# Patient Record
Sex: FEMALE | URBAN - METROPOLITAN AREA
[De-identification: names, ages, dates, MRNs, and addresses within clinical notes are randomized per-mention and may not be internally consistent; named-entity substitution may affect disease eponyms.]

---

## 2021-11-11 ENCOUNTER — APPOINTMENT (OUTPATIENT)
Dept: URBAN - METROPOLITAN AREA CLINIC 259 | Age: 56
Setting detail: DERMATOLOGY
End: 2021-11-11

## 2021-11-11 ENCOUNTER — APPOINTMENT (OUTPATIENT)
Dept: URBAN - METROPOLITAN AREA CLINIC 257 | Age: 56
Setting detail: DERMATOLOGY
End: 2021-11-11

## 2021-11-11 VITALS — HEIGHT: 64 IN | WEIGHT: 122 LBS

## 2021-11-11 DIAGNOSIS — L64.8 OTHER ANDROGENIC ALOPECIA: ICD-10-CM

## 2021-11-11 DIAGNOSIS — L57.8 OTHER SKIN CHANGES DUE TO CHRONIC EXPOSURE TO NONIONIZING RADIATION: ICD-10-CM

## 2021-11-11 DIAGNOSIS — L65.0 TELOGEN EFFLUVIUM: ICD-10-CM

## 2021-11-11 DIAGNOSIS — D22 MELANOCYTIC NEVI: ICD-10-CM

## 2021-11-11 DIAGNOSIS — L82.1 OTHER SEBORRHEIC KERATOSIS: ICD-10-CM

## 2021-11-11 PROBLEM — D22.5 MELANOCYTIC NEVI OF TRUNK: Status: ACTIVE | Noted: 2021-11-11

## 2021-11-11 PROCEDURE — OTHER ADDITIONAL NOTES: OTHER

## 2021-11-11 PROCEDURE — 99203 OFFICE O/P NEW LOW 30 MIN: CPT

## 2021-11-11 PROCEDURE — OTHER MIPS QUALITY: OTHER

## 2021-11-11 PROCEDURE — OTHER PRESCRIPTION MEDICATION MANAGEMENT: OTHER

## 2021-11-11 PROCEDURE — OTHER COUNSELING: OTHER

## 2021-11-11 ASSESSMENT — LOCATION DETAILED DESCRIPTION DERM
LOCATION DETAILED: RIGHT MID-UPPER BACK
LOCATION DETAILED: LEFT SUPERIOR LATERAL UPPER BACK
LOCATION DETAILED: RIGHT MEDIAL FRONTAL SCALP
LOCATION DETAILED: LEFT SUPERIOR UPPER BACK
LOCATION DETAILED: RIGHT SUPERIOR UPPER BACK

## 2021-11-11 ASSESSMENT — LOCATION SIMPLE DESCRIPTION DERM
LOCATION SIMPLE: RIGHT SCALP
LOCATION SIMPLE: LEFT UPPER BACK
LOCATION SIMPLE: RIGHT UPPER BACK

## 2021-11-11 ASSESSMENT — LOCATION ZONE DERM
LOCATION ZONE: SCALP
LOCATION ZONE: TRUNK

## 2021-11-11 NOTE — PROCEDURE: ADDITIONAL NOTES
Render Risk Assessment In Note?: no
Detail Level: Simple
Additional Notes: She agrees this is in fact a result from her Testosterone. She states she will discontinue that as the cons are outing weighing the pros. Discussed with time, this will cycle will end once testosterone comes down and stress resolves, this should return to normal

## 2021-11-11 NOTE — PROCEDURE: PRESCRIPTION MEDICATION MANAGEMENT
Detail Level: Zone
Initiate Treatment: Gerisol drops
Render In Strict Bullet Format?: No
Plan: Continue with Spironolactone 100 mg BID  and Finasteride 5 mg . She has refills from her PCP. She will follow up in 2 months and consider tapering dosing

## 2021-11-11 NOTE — HPI: HAIR LOSS
Previous Labs: Yes
How Did The Hair Loss Occur?: gradual in onset
How Severe Is Your Hair Loss?: moderate
Additional History: Patient states she started horomone replacement therapy about 1.5 years ago. Since starting testosterone she notices every time, her hair just starts falling out. They tweaked her doses and still doesn’t help. She had her last testosterone about 2 months ago. She will never do it again.
When Were The Labs Drawn? (Drawn...): 11/8/21
Lab Details: WML

## 2021-11-11 NOTE — PROCEDURE: COUNSELING
Detail Level: Simple
Patient Specific Counseling (Will Not Stick From Patient To Patient): She is taking spironolactone 100 mg bid and finasteride 5 mg and at this point we will see her back in one month with the plan to taper her down and hopefully off.  She agrees to plan.
Detail Level: Zone

## 2021-12-21 ENCOUNTER — TRANSFERRED RECORDS (OUTPATIENT)
Dept: HEALTH INFORMATION MANAGEMENT | Facility: CLINIC | Age: 56
End: 2021-12-21
Payer: COMMERCIAL

## 2021-12-21 ENCOUNTER — APPOINTMENT (OUTPATIENT)
Dept: URBAN - METROPOLITAN AREA CLINIC 257 | Age: 56
Setting detail: DERMATOLOGY
End: 2022-01-03

## 2021-12-21 DIAGNOSIS — L65.0 TELOGEN EFFLUVIUM: ICD-10-CM

## 2021-12-21 PROBLEM — L65.9 NONSCARRING HAIR LOSS, UNSPECIFIED: Status: ACTIVE | Noted: 2021-12-21

## 2021-12-21 PROCEDURE — OTHER COUNSELING: OTHER

## 2021-12-21 PROCEDURE — OTHER BIOPSY BY PUNCH METHOD: OTHER

## 2021-12-21 PROCEDURE — 11104 PUNCH BX SKIN SINGLE LESION: CPT

## 2021-12-21 PROCEDURE — OTHER MIPS QUALITY: OTHER

## 2021-12-21 PROCEDURE — 11105 PUNCH BX SKIN EA SEP/ADDL: CPT

## 2021-12-21 ASSESSMENT — LOCATION SIMPLE DESCRIPTION DERM
LOCATION SIMPLE: LEFT SCALP
LOCATION SIMPLE: ANTERIOR SCALP

## 2021-12-21 ASSESSMENT — LOCATION DETAILED DESCRIPTION DERM
LOCATION DETAILED: MID-FRONTAL SCALP
LOCATION DETAILED: LEFT CENTRAL FRONTAL SCALP

## 2021-12-21 ASSESSMENT — LOCATION ZONE DERM: LOCATION ZONE: SCALP

## 2021-12-21 NOTE — PROCEDURE: BIOPSY BY PUNCH METHOD
Was A Bandage Applied: Yes
Anesthesia Type: 1% lidocaine with epinephrine
Anesthesia Volume In Cc (Will Not Render If 0): 0.5
Dressing: bandage
Size Of Lesion In Cm (Optional): 0
Render In Bullet Format When Appropriate: No
Notification Instructions: Patient will be notified of biopsy results. However, patient instructed to call the office if not contacted within 2 weeks.
Biopsy Type: H and E
Epidermal Sutures: gel foam
Home Suture Removal Text: Patient was provided a home suture removal kit and will remove their sutures at home.  If they have any questions or difficulties they will call the office.
Wound Care: Petrolatum
Information: Selecting Yes will display possible errors in your note based on the variables you have selected. This validation is only offered as a suggestion for you. PLEASE NOTE THAT THE VALIDATION TEXT WILL BE REMOVED WHEN YOU FINALIZE YOUR NOTE. IF YOU WANT TO FAX A PRELIMINARY NOTE YOU WILL NEED TO TOGGLE THIS TO 'NO' IF YOU DO NOT WANT IT IN YOUR FAXED NOTE.
Consent: Written consent was obtained and risks were reviewed including but not limited to scarring, infection, bleeding, scabbing, incomplete removal, nerve damage and allergy to anesthesia.
Punch Size In Mm: 3
Detail Level: Detailed
Billing Type: Third-Party Bill
Hemostasis: None
Post-Care Instructions: I reviewed with the patient in detail post-care instructions. Patient is to keep the biopsy site covered with Vaseline for 1 week
Post-Care Instructions: I reviewed with the patient in detail post-care instructions. Patient is to keep the biopsy site dry overnight, and then apply bacitracin twice daily until healed. Patient may apply hydrogen peroxide soaks to remove any crusting.

## 2022-01-05 ENCOUNTER — RX ONLY (RX ONLY)
Age: 57
End: 2022-01-05

## 2022-01-05 RX ORDER — SPIRONOLACTONE 100 MG/1
TABLET, FILM COATED ORAL
Qty: 180 | Refills: 1 | Status: ERX

## 2022-01-05 RX ORDER — FINASTERIDE 5 MG/1
TABLET, FILM COATED ORAL
Qty: 30 | Refills: 2 | Status: ERX

## 2022-01-05 RX ORDER — KETOCONAZOLE 20 MG/ML
SHAMPOO, SUSPENSION TOPICAL
Qty: 120 | Refills: 3 | Status: ERX | COMMUNITY
Start: 2022-01-05

## 2022-01-07 ENCOUNTER — TRANSCRIBE ORDERS (OUTPATIENT)
Dept: OTHER | Age: 57
End: 2022-01-07

## 2022-01-07 DIAGNOSIS — L65.9 HAIR LOSS: Primary | ICD-10-CM

## 2022-03-06 ENCOUNTER — HEALTH MAINTENANCE LETTER (OUTPATIENT)
Age: 57
End: 2022-03-06

## 2022-04-06 NOTE — TELEPHONE ENCOUNTER
FUTURE VISIT INFORMATION      FUTURE VISIT INFORMATION:    Date: 6.28.22    Time: 3:30    Location: [facility]  REFERRAL INFORMATION:    Referring provider:  Parth    Referring providers clinic:  [facility]    Reason for visit/diagnosis  Referred for: Hair loss; hairloss likely from hormone replacement      Scheduled: by Pt    RECORDS REQUESTED FROM:       Clinic name Comments Records Status   [facility] 12.21.21, 11.11.21  Path # [id number] Received  Sent to scanning

## 2022-06-09 ENCOUNTER — RX ONLY (RX ONLY)
Age: 57
End: 2022-06-09

## 2022-06-09 RX ORDER — KETOCONAZOLE 20 MG/ML
SHAMPOO, SUSPENSION TOPICAL
Qty: 120 | Refills: 2 | Status: ERX

## 2022-06-28 ENCOUNTER — PRE VISIT (OUTPATIENT)
Dept: DERMATOLOGY | Facility: CLINIC | Age: 57
End: 2022-06-28

## 2022-07-26 ENCOUNTER — TELEPHONE (OUTPATIENT)
Dept: DERMATOLOGY | Facility: CLINIC | Age: 57
End: 2022-07-26

## 2022-07-26 NOTE — TELEPHONE ENCOUNTER
I spoke with patient informing her someone would reach out and schedule her an appointment with Dr. Puckett as soon as a schedule opens for her.

## 2022-08-03 ENCOUNTER — TRANSFERRED RECORDS (OUTPATIENT)
Dept: HEALTH INFORMATION MANAGEMENT | Facility: CLINIC | Age: 57
End: 2022-08-03

## 2022-08-03 ENCOUNTER — APPOINTMENT (OUTPATIENT)
Dept: URBAN - METROPOLITAN AREA CLINIC 257 | Age: 57
Setting detail: DERMATOLOGY
End: 2022-08-03

## 2022-08-03 DIAGNOSIS — L71.8 OTHER ROSACEA: ICD-10-CM

## 2022-08-03 DIAGNOSIS — L64.8 OTHER ANDROGENIC ALOPECIA: ICD-10-CM

## 2022-08-03 DIAGNOSIS — L65.0 TELOGEN EFFLUVIUM: ICD-10-CM

## 2022-08-03 LAB
ALT SERPL-CCNC: 16 U/L (ref 6–29)
AST SERPL-CCNC: 19 U/L (ref 10–35)
CREATININE (EXTERNAL): 0.83 MG/DL (ref 0.5–1.03)
GLUCOSE (EXTERNAL): 91 MG/DL (ref 65–99)
POTASSIUM (EXTERNAL): 4.5 MMOL/L (ref 3.5–5.3)
TSH SERPL-ACNC: 1.61 MIU/L (ref 0.4–4.5)

## 2022-08-03 PROCEDURE — OTHER PRESCRIPTION: OTHER

## 2022-08-03 PROCEDURE — OTHER MIPS QUALITY: OTHER

## 2022-08-03 PROCEDURE — OTHER COUNSELING: OTHER

## 2022-08-03 PROCEDURE — OTHER ORDER TESTS: OTHER

## 2022-08-03 PROCEDURE — OTHER VENIPUNCTURE: OTHER

## 2022-08-03 PROCEDURE — 36415 COLL VENOUS BLD VENIPUNCTURE: CPT

## 2022-08-03 PROCEDURE — 99214 OFFICE O/P EST MOD 30 MIN: CPT

## 2022-08-03 RX ORDER — METRONIDAZOLE 7.5 MG/G
GEL TOPICAL
Qty: 45 | Refills: 2 | Status: ERX | COMMUNITY
Start: 2022-08-03

## 2022-08-03 RX ORDER — SPIRONOLACTONE 100 MG/1
TABLET, FILM COATED ORAL
Qty: 180 | Refills: 1 | Status: ERX

## 2022-08-03 RX ORDER — FINASTERIDE 5 MG/1
TABLET, FILM COATED ORAL QD
Qty: 90 | Refills: 1 | Status: ERX

## 2022-08-03 ASSESSMENT — LOCATION SIMPLE DESCRIPTION DERM
LOCATION SIMPLE: NOSE
LOCATION SIMPLE: RIGHT SCALP

## 2022-08-03 ASSESSMENT — LOCATION DETAILED DESCRIPTION DERM
LOCATION DETAILED: RIGHT MEDIAL FRONTAL SCALP
LOCATION DETAILED: NASAL TIP

## 2022-08-03 ASSESSMENT — LOCATION ZONE DERM
LOCATION ZONE: NOSE
LOCATION ZONE: SCALP

## 2022-08-03 NOTE — PROCEDURE: VENIPUNCTURE
Number Of Tubes Drawn: 5
Venipuncture Paragraph: An alcohol pad was applied to the venipuncture site. Venipuncture was performed using a butterfly needle. Pressure and a bandaid was applied to the site. No complications were noted.
Bill 20536 For Specimen Handling/Conveyance To Laboratory?: no
Detail Level: None

## 2022-11-21 ENCOUNTER — HEALTH MAINTENANCE LETTER (OUTPATIENT)
Age: 57
End: 2022-11-21

## 2022-12-15 ENCOUNTER — RX ONLY (RX ONLY)
Age: 57
End: 2022-12-15

## 2022-12-15 RX ORDER — VALACYCLOVIR HYDROCHLORIDE 1 G/1
TABLET, FILM COATED ORAL
Qty: 8 | Refills: 2 | Status: ERX | COMMUNITY
Start: 2022-12-15

## 2023-02-06 ENCOUNTER — RX ONLY (RX ONLY)
Age: 58
End: 2023-02-06

## 2023-02-06 RX ORDER — FINASTERIDE 5 MG/1
TABLET, FILM COATED ORAL
Qty: 90 | Refills: 1 | Status: ERX

## 2023-04-16 ENCOUNTER — HEALTH MAINTENANCE LETTER (OUTPATIENT)
Age: 58
End: 2023-04-16

## 2023-04-27 RX ORDER — SPIRONOLACTONE 100 MG/1
TABLET, FILM COATED ORAL
Qty: 60 | Refills: 0 | Status: ACTIVE

## 2023-05-03 ENCOUNTER — APPOINTMENT (OUTPATIENT)
Dept: URBAN - METROPOLITAN AREA CLINIC 257 | Age: 58
Setting detail: DERMATOLOGY
End: 2023-05-03

## 2023-05-03 ENCOUNTER — TRANSFERRED RECORDS (OUTPATIENT)
Dept: HEALTH INFORMATION MANAGEMENT | Facility: CLINIC | Age: 58
End: 2023-05-03
Payer: COMMERCIAL

## 2023-05-03 DIAGNOSIS — L64.8 OTHER ANDROGENIC ALOPECIA: ICD-10-CM

## 2023-05-03 DIAGNOSIS — L65.0 TELOGEN EFFLUVIUM: ICD-10-CM

## 2023-05-03 PROCEDURE — 99213 OFFICE O/P EST LOW 20 MIN: CPT

## 2023-05-03 PROCEDURE — OTHER PRESCRIPTION MEDICATION MANAGEMENT: OTHER

## 2023-05-03 PROCEDURE — OTHER COUNSELING: OTHER

## 2023-05-03 PROCEDURE — OTHER MIPS QUALITY: OTHER

## 2023-05-03 PROCEDURE — OTHER PRESCRIPTION: OTHER

## 2023-05-03 RX ORDER — FINASTERIDE 5 MG/1
TABLET, FILM COATED ORAL QD
Qty: 90 | Refills: 1 | Status: ERX

## 2023-05-03 RX ORDER — SPIRONOLACTONE 100 MG/1
TABLET, FILM COATED ORAL
Qty: 180 | Refills: 3 | Status: ERX

## 2023-05-03 RX ORDER — KETOCONAZOLE 20 MG/ML
SHAMPOO, SUSPENSION TOPICAL
Qty: 120 | Refills: 5 | Status: ERX

## 2023-05-03 ASSESSMENT — LOCATION DETAILED DESCRIPTION DERM
LOCATION DETAILED: LEFT SUPERIOR PARIETAL SCALP
LOCATION DETAILED: LEFT CENTRAL FRONTAL SCALP

## 2023-05-03 ASSESSMENT — LOCATION SIMPLE DESCRIPTION DERM
LOCATION SIMPLE: SCALP
LOCATION SIMPLE: LEFT SCALP

## 2023-05-03 ASSESSMENT — LOCATION ZONE DERM: LOCATION ZONE: SCALP

## 2023-05-03 NOTE — PROCEDURE: PRESCRIPTION MEDICATION MANAGEMENT
Continue Regimen: Finasteride 5mg QD\\nSpironolactone 100mg BID\\nKetoconazole 2% shampoo
Plan: Scalp appears normal. Pt has severe scalp tenderness. Hair pull test is negative. Will refer pt to Dr. Cardona. Pt has been referred to Dr. Cardona in the past but her appt was cancelled and she was never able to get scheduled for another appt. We will send a new referral today. We will see her back pending on Dr. Cardona’s plan.
Detail Level: Zone
Render In Strict Bullet Format?: No

## 2023-05-03 NOTE — PROCEDURE: COUNSELING
Detail Level: Zone
Patient Specific Counseling (Will Not Stick From Patient To Patient): Scalp appears normal. Pt has severe scalp tenderness. Hair pull test is negative. Will refer pt to  for further evaluation as I am unable to determine the cause of her scalp tenderness.  In the meantime we will CCC as she is tolerating this well and would like to continue.

## 2023-05-05 ENCOUNTER — TRANSCRIBE ORDERS (OUTPATIENT)
Dept: OTHER | Age: 58
End: 2023-05-05

## 2023-05-05 DIAGNOSIS — L65.0 TELOGEN EFFLUVIUM: Primary | ICD-10-CM

## 2023-05-05 DIAGNOSIS — L64.9 ANDROGENIC ALOPECIA: ICD-10-CM

## 2023-05-09 ENCOUNTER — TELEPHONE (OUTPATIENT)
Dept: DERMATOLOGY | Facility: CLINIC | Age: 58
End: 2023-05-09
Payer: COMMERCIAL

## 2023-05-09 NOTE — TELEPHONE ENCOUNTER
[facility] Call Center    Phone Message    May a detailed message be left on voicemail: yes     Reason for Call: Appointment Intake    Referring Provider Name: Nuno Alba @ [facility]  . [phone]  Fax [phone]     Diagnosis and/or Symptoms: Telogen effluvium  Androgenic alopecia - Dr Covarrubias- Hair loss    Pt had originally had a referral created 01/07/22 and was scheduled 01/08/23 for 06/28/22 Appt. Pt's Appt was canceled on 06/28/22 due to provider was sick.   Pt was told that Dr. Puckett would be opening a day up and would get a call back. Pt never received that call. On 07/26/22 pt received a [employer] message stating   At the time Dr. Puckett has no availability however she has a wait list which pt is added to. Someone will reach out and get her schedule when there is an opening.   Pt never heard back from anyone in the clinic again. Pt received another referral 05/05/23 to see Dr. Puckett. pt is very concern that this might happen again. Pt said   this is the worse pt care ever, and never in my life has this happened to her. Pt did schedule for the first available Appt 02/12/24 and wait listed. Pt requested to receive a   call from one of the care teams of Dr. Puckett to discuss. Please call pt back to discuss. Thanks       Action Taken: Message routed to:  [facility] Derm    Travel Screening: Not Applicable

## 2023-05-23 NOTE — TELEPHONE ENCOUNTER
L65.0 (ICD-10-CM) - Telogen effluvium   L64.9 (ICD-10-CM) - Androgenic alopecia     Referred to Dr. Puckett by Nuno Alba @ [facility]    Writer scheduled patient for 8/7/23. Patient was provided appointment details and had no questions.    Shauna Mckinnon LPN

## 2023-08-07 ENCOUNTER — LAB (OUTPATIENT)
Dept: LAB | Facility: CLINIC | Age: 58
End: 2023-08-07
Payer: COMMERCIAL

## 2023-08-07 ENCOUNTER — OFFICE VISIT (OUTPATIENT)
Dept: DERMATOLOGY | Facility: CLINIC | Age: 58
End: 2023-08-07
Payer: COMMERCIAL

## 2023-08-07 VITALS — HEART RATE: 64 BPM | SYSTOLIC BLOOD PRESSURE: 130 MMHG | DIASTOLIC BLOOD PRESSURE: 82 MMHG

## 2023-08-07 DIAGNOSIS — L65.9 LOSS OF HAIR: ICD-10-CM

## 2023-08-07 DIAGNOSIS — L65.9 LOSS OF HAIR: Primary | ICD-10-CM

## 2023-08-07 DIAGNOSIS — L30.9 DERMATITIS: ICD-10-CM

## 2023-08-07 LAB
ALBUMIN SERPL BCG-MCNC: 5.3 G/DL (ref 3.5–5.2)
ALP SERPL-CCNC: 62 U/L (ref 35–104)
ALT SERPL W P-5'-P-CCNC: 27 U/L (ref 0–50)
ANION GAP SERPL CALCULATED.3IONS-SCNC: 11 MMOL/L (ref 7–15)
AST SERPL W P-5'-P-CCNC: 27 U/L (ref 0–45)
BASOPHILS # BLD AUTO: 0 10E3/UL (ref 0–0.2)
BASOPHILS NFR BLD AUTO: 1 %
BILIRUB SERPL-MCNC: 0.4 MG/DL
BUN SERPL-MCNC: 11.9 MG/DL (ref 6–20)
CALCIUM SERPL-MCNC: 10.2 MG/DL (ref 8.6–10)
CHLORIDE SERPL-SCNC: 102 MMOL/L (ref 98–107)
CREAT SERPL-MCNC: 0.75 MG/DL (ref 0.51–0.95)
DEPRECATED HCO3 PLAS-SCNC: 26 MMOL/L (ref 22–29)
EOSINOPHIL # BLD AUTO: 0 10E3/UL (ref 0–0.7)
EOSINOPHIL NFR BLD AUTO: 1 %
ERYTHROCYTE [DISTWIDTH] IN BLOOD BY AUTOMATED COUNT: 12.4 % (ref 10–15)
FERRITIN SERPL-MCNC: 99 NG/ML (ref 11–328)
GFR SERPL CREATININE-BSD FRML MDRD: >90 ML/MIN/1.73M2
GLUCOSE SERPL-MCNC: 106 MG/DL (ref 70–99)
HCT VFR BLD AUTO: 46 % (ref 35–47)
HGB BLD-MCNC: 15.6 G/DL (ref 11.7–15.7)
IMM GRANULOCYTES # BLD: 0 10E3/UL
IMM GRANULOCYTES NFR BLD: 0 %
IRON BINDING CAPACITY (ROCHE): 311 UG/DL (ref 240–430)
IRON SATN MFR SERPL: 42 % (ref 15–46)
IRON SERPL-MCNC: 131 UG/DL (ref 37–145)
LYMPHOCYTES # BLD AUTO: 1 10E3/UL (ref 0.8–5.3)
LYMPHOCYTES NFR BLD AUTO: 23 %
MAGNESIUM SERPL-MCNC: 2.2 MG/DL (ref 1.7–2.3)
MCH RBC QN AUTO: 33 PG (ref 26.5–33)
MCHC RBC AUTO-ENTMCNC: 33.9 G/DL (ref 31.5–36.5)
MCV RBC AUTO: 97 FL (ref 78–100)
MONOCYTES # BLD AUTO: 0.3 10E3/UL (ref 0–1.3)
MONOCYTES NFR BLD AUTO: 6 %
NEUTROPHILS # BLD AUTO: 3 10E3/UL (ref 1.6–8.3)
NEUTROPHILS NFR BLD AUTO: 69 %
NRBC # BLD AUTO: 0 10E3/UL
NRBC BLD AUTO-RTO: 0 /100
PLATELET # BLD AUTO: 322 10E3/UL (ref 150–450)
POTASSIUM SERPL-SCNC: 4.6 MMOL/L (ref 3.4–5.3)
PROT SERPL-MCNC: 7.9 G/DL (ref 6.4–8.3)
RBC # BLD AUTO: 4.73 10E6/UL (ref 3.8–5.2)
SODIUM SERPL-SCNC: 139 MMOL/L (ref 136–145)
TSH SERPL DL<=0.005 MIU/L-ACNC: 1.61 UIU/ML (ref 0.3–4.2)
WBC # BLD AUTO: 4.4 10E3/UL (ref 4–11)

## 2023-08-07 PROCEDURE — 82728 ASSAY OF FERRITIN: CPT | Performed by: PATHOLOGY

## 2023-08-07 PROCEDURE — 99000 SPECIMEN HANDLING OFFICE-LAB: CPT | Performed by: PATHOLOGY

## 2023-08-07 PROCEDURE — 36415 COLL VENOUS BLD VENIPUNCTURE: CPT | Performed by: PATHOLOGY

## 2023-08-07 PROCEDURE — 85025 COMPLETE CBC W/AUTO DIFF WBC: CPT | Performed by: PATHOLOGY

## 2023-08-07 PROCEDURE — 84403 ASSAY OF TOTAL TESTOSTERONE: CPT | Performed by: DERMATOLOGY

## 2023-08-07 PROCEDURE — 84270 ASSAY OF SEX HORMONE GLOBUL: CPT | Performed by: DERMATOLOGY

## 2023-08-07 PROCEDURE — 11105 PUNCH BX SKIN EA SEP/ADDL: CPT | Mod: GC | Performed by: DERMATOLOGY

## 2023-08-07 PROCEDURE — 83735 ASSAY OF MAGNESIUM: CPT | Performed by: PATHOLOGY

## 2023-08-07 PROCEDURE — 99204 OFFICE O/P NEW MOD 45 MIN: CPT | Mod: 25 | Performed by: DERMATOLOGY

## 2023-08-07 PROCEDURE — 84630 ASSAY OF ZINC: CPT | Mod: 90 | Performed by: PATHOLOGY

## 2023-08-07 PROCEDURE — 80053 COMPREHEN METABOLIC PANEL: CPT | Performed by: PATHOLOGY

## 2023-08-07 PROCEDURE — 88305 TISSUE EXAM BY PATHOLOGIST: CPT | Mod: 26 | Performed by: PATHOLOGY

## 2023-08-07 PROCEDURE — 11104 PUNCH BX SKIN SINGLE LESION: CPT | Mod: GC | Performed by: DERMATOLOGY

## 2023-08-07 PROCEDURE — 88305 TISSUE EXAM BY PATHOLOGIST: CPT | Mod: TC | Performed by: DERMATOLOGY

## 2023-08-07 PROCEDURE — 83540 ASSAY OF IRON: CPT | Performed by: PATHOLOGY

## 2023-08-07 PROCEDURE — 83550 IRON BINDING TEST: CPT | Performed by: PATHOLOGY

## 2023-08-07 PROCEDURE — 82627 DEHYDROEPIANDROSTERONE: CPT | Performed by: DERMATOLOGY

## 2023-08-07 PROCEDURE — 82306 VITAMIN D 25 HYDROXY: CPT | Performed by: DERMATOLOGY

## 2023-08-07 PROCEDURE — 84443 ASSAY THYROID STIM HORMONE: CPT | Performed by: PATHOLOGY

## 2023-08-07 RX ORDER — CYCLOSPORINE 0.5 MG/ML
EMULSION OPHTHALMIC
COMMUNITY
Start: 2022-11-02

## 2023-08-07 RX ORDER — FINASTERIDE 5 MG/1
1 TABLET, FILM COATED ORAL DAILY
COMMUNITY

## 2023-08-07 RX ORDER — FLUOCINOLONE ACETONIDE 0.11 MG/ML
OIL TOPICAL WEEKLY
Qty: 118 ML | Refills: 5 | Status: SHIPPED | OUTPATIENT
Start: 2023-08-07

## 2023-08-07 RX ORDER — SPIRONOLACTONE 100 MG/1
100 TABLET, FILM COATED ORAL 2 TIMES DAILY
COMMUNITY

## 2023-08-07 RX ORDER — KETOCONAZOLE 20 MG/ML
SHAMPOO TOPICAL DAILY PRN
Qty: 240 ML | Refills: 11 | Status: SHIPPED | OUTPATIENT
Start: 2023-08-07

## 2023-08-07 RX ORDER — KETOCONAZOLE 20 MG/ML
SHAMPOO TOPICAL
COMMUNITY

## 2023-08-07 ASSESSMENT — PAIN SCALES - GENERAL: PAINLEVEL: NO PAIN (0)

## 2023-08-07 NOTE — NURSING NOTE
Dermatology Rooming Note    Paulette Knight's goals for this visit include:   Chief Complaint   Patient presents with    Hair Loss     Pt has concern about hair less that started in 2020, with pruritus.      Lincoln Owusu, EMT-B

## 2023-08-07 NOTE — PROGRESS NOTES
[facility] Summary (8/7/2023)    Frontal anterior (6.5 cm)    Mid scalp (12 cm)    Vertex (24 cm)    Occipital (30 cm)    Right temporal (9, 9 cm)    Left temporal (8, 8 cm)    Summary

## 2023-08-07 NOTE — PATIENT INSTRUCTIONS
Wound Care After a Biopsy    What is a skin biopsy?  A skin biopsy allows the doctor to examine a very small piece of tissue under the microscope to determine the diagnosis and the best treatment for the skin condition. A local anesthetic (numbing medicine) is injected with a very small needle into the skin area to be tested. A small piece of skin is taken from the area. Sometimes a suture (stitch) is used.     What are the risks of a skin biopsy?  I will experience scar, bleeding, swelling, pain, crusting and redness. I may experience incomplete removal or recurrence. Risks of this procedure are excessive bleeding, bruising, infection, nerve damage, numbness, thick (hypertrophic or keloidal) scar and non-diagnostic biopsy.    How should I care for my wound for the first 24 hours?  Keep the wound dry and covered for 24 hours  If it bleeds, hold direct pressure on the area for 15 minutes. If bleeding does not stop, call us or go to the emergency room  Avoid strenuous exercise the first 1-2 days or as your doctor instructs you    How should I care for the wound after 24 hours?  After 24 hours, remove the bandage  You may bathe or shower as normal  If you had a scalp biopsy, you can shampoo as usual and can use shower water to clean the biopsy site daily  Clean the wound once a day with gentle soap and water  Do not scrub, be gentle  Apply white petroleum/Vaseline after cleaning the wound with a cotton swab or a clean finger, and keep the site covered with a Bandaid /bandage. Bandages are not necessary with a scalp biopsy  If you are unable to cover the site with a Bandaid /bandage, re-apply ointment 2-3 times a day to keep the site moist. Moisture will help with healing  Avoid strenuous activity for first 1-2 days  Avoid lakes, rivers, pools, and oceans until the stitches are removed or the site is healed    How do I clean my wound?  Wash hands thoroughly with soap or use hand  before all wound care  Clean  the wound with gentle soap and water  Apply white petroleum/Vaseline  to wound after it is clean  Replace the Bandaid /bandage to keep the wound covered for the first few days or as instructed by your doctor  If you had a scalp biopsy, warm shower water to the area on a daily basis should suffice    What should I use to clean my wound?   Cotton-tipped applicators (Qtips )  White petroleum jelly (Vaseline ). Use a clean new container and use Q-tips to apply.  Bandaids  as needed  Gentle soap     How should I care for my wound long term?  Do not get your wound dirty  Keep up with wound care for one week or until the area is healed.  If you have stitches, stitches need to be removed in 14 days. You may return to our clinic for this or you may have it done locally at your doctor s office.  A small scab will form and fall off by itself when the area is completely healed. The area will be red and will become pink in color as it heals. Sun protection is very important for how your scar will turn out. Sunscreen with an SPF 30 or greater is recommended once the area is healed.  You should have some soreness but it should be mild and slowly go away over several days. Talk to your doctor about using tylenol for pain,    When should I call my doctor?  If you have increased:   Pain or swelling  Pus or drainage (clear or slightly yellow drainage is ok)  Temperature over 100F  Spreading redness or warmth around wound    When will I hear about my results?  The biopsy results can take 2 weeks to come back.  Your results will automatically release to [employer] before your provider has even reviewed them.  The clinic will call you with the results, send you a [employer] message, or have you schedule a follow-up clinic or phone time to discuss the results.  Contact our clinics if you do not hear from us in 2 weeks.    Who should I call with questions?  [facility]: [phone]  [facility]  [facility]: [phone]  For urgent needs outside of business hours call the [facility] at [phone] and ask for the dermatology resident on call

## 2023-08-07 NOTE — PROGRESS NOTES
[facility] Dermatology Note  Encounter Date: Aug 7, 2023  Office Visit     Dermatology Problem List:  1. Hair loss with scalp dysesthesia  - repeat punch bx 08/07/23   - biopsied 12/2021 as androgenetic alopecia  - Current: spironolactone, finasteride, ketoconazole, fluocinolone oil   ____________________________________________    Assessment & Plan:   # Hair loss with scalp dysesthesias  Patient with several year history of hair loss that was biopsied at outside clinic as androgenetic alopecia.  Interestingly with scalp dysesthesias including scalp burning and tingling. Differential diagnosis includes androgenetic alopecia versus telogen effluvium versus early scarring alopecia such as lichen plano pilaris.  Discussed further work-up to help guide treatment options with hair labs, scalp biopsy which patient was agreeable. Remains on spironolactone, finasteride, and ketoconazole and recommended to continue these therapies until all information is acquired. Will add fluocinolone oil given presence of perifollicular and loose scale. Future options could include other topical steroids, topical gabapentin solution, and oral minoxidil.  - [facility] 08/07/23   - Hair labs: CBC, CMP, TSH, Vitamin D, iron studies, Zinc/Mg, testosterone, DHEAs  - fluocinolone acetonide (DERMA SMOOTHE/FS BODY) 0.01 % external oil; Apply topically once a week  Dispense: 118 mL; Refill: 5  - ketoconazole (NIZORAL) 2 % external shampoo; Apply topically daily as needed for itching or irritation  Dispense: 240 mL; Refill: 11  - Continue spironolactone 200mg daily and finasteride 5mg daily      Procedures Performed:   - Punch biopsy procedure note, location(s): left temporal scalp. After discussion of benefits and risks including but not limited to bleeding, infection, scar, incomplete removal, recurrence, and non-diagnostic biopsy, written consent and photographs were obtained. The area was cleaned with isopropyl alcohol.  0.5mL of 1% lidocaine with epinephrine was injected to obtain adequate anesthesia and a 4 mm punch biopsy was performed at site(s). 4-0 Prolene sutures were utilized to approximate the epidermal edges. White petrolatum ointment and a bandage was applied to the wound. Explicit verbal and written wound care instructions were provided. The patient left the dermatology clinic in good condition.     Follow-up: 2 weeks phone visit    Staff and Resident:     Abhilash Chang MD  PGY-4 Dermatology  Pager: [phone]    Patient was seen and examined with the dermatology resident. I agree with the history, review of systems, physical examination, assessments and plan I was present  for the key portion of the biopsy procedure    Lou Puckett MD  Professor and  Chair  Department of Dermatology  [facility]     ____________________________________________    CC: Hair Loss (Pt has concern about hair less that started in 2020, with pruritus. )    HPI:      Per referral note:      Ms. Paulette Knight is a 58 year old female who presents as a new patient for evaluation of hair loss.   - Seen at the request of: Norberto Alba  - Reason for referral: hair loss  - Onset of hair loss: 2020  - Affected areas: diffuse, super sensitive in the frontal scalp  - Shedding or thinning, or both: both  - Percentage of hair loss: 50% more thin  No Scalp pain   Yes Scalp burning - more tingling   No Scalp itching    No Eyebrow changes    No Eyelash changes   No Beard changes    No Other body hair changes    No Nail changes    No Additional symptoms? (please list below)     - Current treatments: finasteride 5mg, spironolactone 100mg, and ketoconazole shampoo  - Prior discontinued treatments: n/a  - Prior biopsies: yes  - Prior labs: per care everywhere  - Scalp or hair care habits/products: ketoconazole   - - - Which products? How many times per week? Twice a week, with ketoconazole shampoo; OTC conditioners   - - - Frequency of hair sprays,  gels, dyes, heat styling, perms, weaves or extensions? Dyes hair every 10 professional, no irritation  - - - Sunscreen use on face or scalp? If so, what brand?   - Menses: menopause at 52  - Unwanted hair growth/hirsutism: none  - Diet (meat, vegetarian, mixed): regular  - Recent weight loss: none  - Personal history of thyroid dysfunction or autoimmune disease: none  - Family history of hair loss: yes brother and father  - Family history of autoimmune disease: none, daughter with thyroid, mother with thyroid  - Major family, financial, school/work, or other social stressors in the few months prior to hair loss: recent cataract surgery, torn retina, CT scan for abdominal pain - now resolved  - Major recent illness/hospitalizations: none    - Hair loss started after hormone replacement 2020 starting having scalp itching, ongoing scalp itching. Started on finasteride and spironolactone. Was on testosterone, progesterone, estrogen. Now off hormone replacement, no progesterone or estrogen in March. Takes many supplements, boron, fish oil, vitamin D, silica. Patient is otherwise feeling well, in usual state of health, and has no additional skin concerns today.     Labs:      Physical Exam:  Vitals: /82   Pulse 64   GEN: Well developed, well-nourished, in no acute distress, in a pleasant mood.    SKIN: Focused examination of scalp, face and hands  was performed.  - Campos type: 2  - Decreased hair density in frontotemporal areas, madie part width 1.1 throughout  - No active erythema, mild loose scale, no significant perifollicular erythema  - Negative hair pullt est, normal eyelash and eyebrow density, no nail pitting or dystrophy    Medications:  Current Outpatient Medications   Medication    finasteride (PROSCAR) 5 MG tablet    ketoconazole (NIZORAL) 2 % external shampoo    RESTASIS 0.05 % ophthalmic emulsion    spironolactone (ALDACTONE) 100 MG tablet     No current facility-administered medications for  this visit.      Past Medical History:   There is no problem list on file for this patient.    History reviewed. No pertinent past medical history.    CC No referring provider defined for this encounter. on close of this encounter.

## 2023-08-07 NOTE — LETTER
8/7/2023       RE: Paulette Knight  [address]  [place] [address]     Dear Colleague,    Thank you for referring your patient, Paulette Knight, to the [facility] [place] at [facility]. Please see a copy of my visit note below.    See other note.    [facility] Dermatology Note  Encounter Date: Aug 7, 2023  Office Visit     Dermatology Problem List:  1. Hair loss with scalp dysesthesia  - repeat punch bx 08/07/23   - biopsied 12/2021 as androgenetic alopecia  - Current: spironolactone, finasteride, ketoconazole, fluocinolone oil   ____________________________________________    Assessment & Plan:   # Hair loss with scalp dysesthesias  Patient with several year history of hair loss that was biopsied at outside clinic as androgenetic alopecia.  Interestingly with scalp dysesthesias including scalp burning and tingling. Differential diagnosis includes androgenetic alopecia versus telogen effluvium versus early scarring alopecia such as lichen plano pilaris.  Discussed further work-up to help guide treatment options with hair labs, scalp biopsy which patient was agreeable. Remains on spironolactone, finasteride, and ketoconazole and recommended to continue these therapies until all information is acquired. Will add fluocinolone oil given presence of perifollicular and loose scale. Future options could include other topical steroids, topical gabapentin solution, and oral minoxidil.  - [employer] 08/07/23   - Hair labs: CBC, CMP, TSH, Vitamin D, iron studies, Zinc/Mg, testosterone, DHEAs  - fluocinolone acetonide (DERMA SMOOTHE/FS BODY) 0.01 % external oil; Apply topically once a week  Dispense: 118 mL; Refill: 5  - ketoconazole (NIZORAL) 2 % external shampoo; Apply topically daily as needed for itching or irritation  Dispense: 240 mL; Refill: 11  - Continue spironolactone 200mg daily and finasteride 5mg daily      Procedures  Performed:   - Punch biopsy procedure note, location(s): left temporal scalp. After discussion of benefits and risks including but not limited to bleeding, infection, scar, incomplete removal, recurrence, and non-diagnostic biopsy, written consent and photographs were obtained. The area was cleaned with isopropyl alcohol. 0.5mL of 1% lidocaine with epinephrine was injected to obtain adequate anesthesia and a 4 mm punch biopsy was performed at site(s). 4-0 Prolene sutures were utilized to approximate the epidermal edges. White petrolatum ointment and a bandage was applied to the wound. Explicit verbal and written wound care instructions were provided. The patient left the dermatology clinic in good condition.     Follow-up: 2 weeks phone visit    Staff and Resident:     Abhilash Chang MD  PGY-4 Dermatology  Pager: [phone]    Patient was seen and examined with the dermatology resident. I agree with the history, review of systems, physical examination, assessments and plan I was present  for the key portion of the biopsy procedure    Lou Puckett MD  Professor and  Chair  Department of Dermatology  [facility]     ____________________________________________    CC: Hair Loss (Pt has concern about hair less that started in 2020, with pruritus. )    HPI:      Per referral note:      Ms. Paulette Knight is a 58 year old female who presents as a new patient for evaluation of hair loss.   - Seen at the request of: Norberto Alba  - Reason for referral: hair loss  - Onset of hair loss: 2020  - Affected areas: diffuse, super sensitive in the frontal scalp  - Shedding or thinning, or both: both  - Percentage of hair loss: 50% more thin  No Scalp pain   Yes Scalp burning - more tingling   No Scalp itching    No Eyebrow changes    No Eyelash changes   No Beard changes    No Other body hair changes    No Nail changes    No Additional symptoms? (please list below)     - Current treatments: finasteride 5mg, spironolactone  100mg, and ketoconazole shampoo  - Prior discontinued treatments: n/a  - Prior biopsies: yes  - Prior labs: per care everywhere  - Scalp or hair care habits/products: ketoconazole   - - - Which products? How many times per week? Twice a week, with ketoconazole shampoo; OTC conditioners   - - - Frequency of hair sprays, gels, dyes, heat styling, perms, weaves or extensions? Dyes hair every 10 professional, no irritation  - - - Sunscreen use on face or scalp? If so, what brand?   - Menses: menopause at 52  - Unwanted hair growth/hirsutism: none  - Diet (meat, vegetarian, mixed): regular  - Recent weight loss: none  - Personal history of thyroid dysfunction or autoimmune disease: none  - Family history of hair loss: yes brother and father  - Family history of autoimmune disease: none, daughter with thyroid, mother with thyroid  - Major family, financial, school/work, or other social stressors in the few months prior to hair loss: recent cataract surgery, torn retina, CT scan for abdominal pain - now resolved  - Major recent illness/hospitalizations: none    - Hair loss started after hormone replacement 2020 starting having scalp itching, ongoing scalp itching. Started on finasteride and spironolactone. Was on testosterone, progesterone, estrogen. Now off hormone replacement, no progesterone or estrogen in March. Takes many supplements, boron, fish oil, vitamin D, silica. Patient is otherwise feeling well, in usual state of health, and has no additional skin concerns today.     Labs:      Physical Exam:  Vitals: /82   Pulse 64   GEN: Well developed, well-nourished, in no acute distress, in a pleasant mood.    SKIN: Focused examination of scalp, face and hands  was performed.  - Campos type: 2  - Decreased hair density in frontotemporal areas, madie part width 1.1 throughout  - No active erythema, mild loose scale, no significant perifollicular erythema  - Negative hair pullt est, normal eyelash and eyebrow  density, no nail pitting or dystrophy    Medications:  Current Outpatient Medications   Medication     finasteride (PROSCAR) 5 MG tablet     ketoconazole (NIZORAL) 2 % external shampoo     RESTASIS 0.05 % ophthalmic emulsion     spironolactone (ALDACTONE) 100 MG tablet     No current facility-administered medications for this visit.      Past Medical History:   There is no problem list on file for this patient.    History reviewed. No pertinent past medical history.    CC No referring provider defined for this encounter. on close of this encounter.      [employer] Summary (8/7/2023):    Frontal    Superior    Vertex    Right temporal    Left temporal         [facility] Summary (8/7/2023)    Frontal anterior (6.5 cm)    Mid scalp (12 cm)    Vertex (24 cm)    Occipital (30 cm)    Right temporal (9, 9 cm)    Left temporal (8, 8 cm)    Summary             Again, thank you for allowing me to participate in the care of your patient.      Sincerely,    Lou Puckett MD

## 2023-08-07 NOTE — NURSING NOTE
Lidocaine-epinephrine 1-1:762602 % injection   1.5mL once for one use, starting 8/7/2023 ending 8/7/2023,  2mL disp, R-0, injection  Injected by Dr. Chang

## 2023-08-07 NOTE — PROGRESS NOTES
[employer] Summary (8/7/2023):    Frontal    Superior    Vertex    Right temporal    Left temporal

## 2023-08-08 LAB
DEPRECATED CALCIDIOL+CALCIFEROL SERPL-MC: 92 UG/L (ref 20–75)
DHEA-S SERPL-MCNC: 53 UG/DL (ref 35–430)
SHBG SERPL-SCNC: 127 NMOL/L (ref 30–135)

## 2023-08-09 LAB — ZINC SERPL-MCNC: 266 UG/DL

## 2023-08-10 LAB
TESTOST FREE SERPL-MCNC: 0.03 NG/DL
TESTOST SERPL-MCNC: 4 NG/DL (ref 8–60)

## 2023-08-17 ENCOUNTER — TELEPHONE (OUTPATIENT)
Dept: DERMATOLOGY | Facility: CLINIC | Age: 58
End: 2023-08-17
Payer: COMMERCIAL

## 2023-08-17 NOTE — TELEPHONE ENCOUNTER
Email sent to Dr. Puckett to advise on date needed for telephone follow-up    Shauna Mckinnon LPN

## 2023-08-17 NOTE — TELEPHONE ENCOUNTER
Patient transferred to nurse line from call center. States she was taking 106mg of zinc total due to different medications having zinc in them and she was unaware she was taking that much zinc. States she was taking 2000 international unit(s) of Vitamin D daily. States she is now only taking 31mg of Zinc and has discontinued Vitamin D supplementation.     Patient also states she was going to be called to schedule 2 week phone follow-up to discuss results but has not been contacted yet. Scheduled for 9/15/2023 telephone visit but requesting sooner as this is long time to wait for results.    Miller Marcelino, EMT

## 2023-08-17 NOTE — TELEPHONE ENCOUNTER
"From 8/7/2023 lab results:   Gerard Caldwell,     Please see Dr. Puckett's message regarding your results below.  \"Please let Ms. Knight know her blood test results from derm clinic were OK overall but the following need to be discussed.  The zinc level was 266, well above the upper limits of the normal range which iis 120. Recommend decreasing zinc supplementation. Please confirm the amount she is taking every day.  The vitamin D level was also above the normal range. Please ask her too how much she is currently taking every day. We may want to decrease the amount based on this information.\"     Miller Marcelino, AN Caldwell,     Could you please confirm how much zinc and vitamin d you are taking daily?     -Marie ARANA LPN   Written by Shauna Mckinnon LPN on 8/17/2023 10:22 AM CDT  "

## 2023-08-21 LAB
PATH REPORT.COMMENTS IMP SPEC: NORMAL
PATH REPORT.FINAL DX SPEC: NORMAL
PATH REPORT.GROSS SPEC: NORMAL
PATH REPORT.MICROSCOPIC SPEC OTHER STN: NORMAL
PATH REPORT.RELEVANT HX SPEC: NORMAL

## 2023-08-22 NOTE — TELEPHONE ENCOUNTER
Based on Dr. Puckett's availability, patient has opted for a 7:30 am phone follow-up on 8/23/23    Shauna Mckinnon LPN

## 2023-11-20 ENCOUNTER — RX ONLY (RX ONLY)
Age: 58
End: 2023-11-20

## 2023-11-20 RX ORDER — FINASTERIDE 5 MG/1
TABLET, FILM COATED ORAL
Qty: 90 | Refills: 1 | Status: ERX

## 2024-04-13 ENCOUNTER — HEALTH MAINTENANCE LETTER (OUTPATIENT)
Age: 59
End: 2024-04-13

## 2024-06-22 ENCOUNTER — HEALTH MAINTENANCE LETTER (OUTPATIENT)
Age: 59
End: 2024-06-22

## 2025-05-07 NOTE — TELEPHONE ENCOUNTER
Chronic, at goal (stable), continue current treatment plan   Call switched to 7:45 am on 8/23/23 per Dr. Puckett's request    Patient has agreed to this change.    Shauna Mckinnon LPN

## 2025-07-12 ENCOUNTER — HEALTH MAINTENANCE LETTER (OUTPATIENT)
Age: 60
End: 2025-07-12